# Patient Record
Sex: MALE | Race: WHITE | HISPANIC OR LATINO | Employment: PART TIME | URBAN - METROPOLITAN AREA
[De-identification: names, ages, dates, MRNs, and addresses within clinical notes are randomized per-mention and may not be internally consistent; named-entity substitution may affect disease eponyms.]

---
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
## 2020-02-27 ENCOUNTER — HOSPITAL ENCOUNTER (EMERGENCY)
Facility: HOSPITAL | Age: 20
Discharge: HOME | End: 2020-02-27
Payer: COMMERCIAL

## 2020-02-27 VITALS
RESPIRATION RATE: 16 BRPM | OXYGEN SATURATION: 98 % | WEIGHT: 180.56 LBS | TEMPERATURE: 98.7 F | SYSTOLIC BLOOD PRESSURE: 135 MMHG | DIASTOLIC BLOOD PRESSURE: 60 MMHG | HEART RATE: 79 BPM

## 2020-02-27 DIAGNOSIS — B30.9 ACUTE VIRAL CONJUNCTIVITIS OF RIGHT EYE: Primary | ICD-10-CM

## 2020-02-27 PROCEDURE — 99282 EMERGENCY DEPT VISIT SF MDM: CPT

## 2020-02-27 PROCEDURE — 99284 EMERGENCY DEPT VISIT MOD MDM: CPT | Performed by: PHYSICIAN ASSISTANT

## 2020-02-27 RX ORDER — TETRACAINE HYDROCHLORIDE 5 MG/ML
1 SOLUTION OPHTHALMIC ONCE
Status: COMPLETED | OUTPATIENT
Start: 2020-02-27 | End: 2020-02-27

## 2020-02-27 RX ORDER — ERYTHROMYCIN 5 MG/G
OINTMENT OPHTHALMIC
Qty: 3.5 G | Refills: 0 | Status: SHIPPED | OUTPATIENT
Start: 2020-02-27

## 2020-02-27 RX ADMIN — TETRACAINE HYDROCHLORIDE 1 DROP: 5 SOLUTION OPHTHALMIC at 20:34

## 2020-02-27 RX ADMIN — FLUORESCEIN SODIUM 1 STRIP: 1 STRIP OPHTHALMIC at 20:34

## 2020-02-28 NOTE — DISCHARGE INSTRUCTIONS
Acute viral conjunctivitis  -Erythromycin ointment, half-inch ribbon into both lower eyelid 6 times a day times 5-7 days  -Warm compress and ice

## 2020-02-28 NOTE — ED PROVIDER NOTES
History  Chief Complaint   Patient presents with    Eye Redness     Pt reports redness and irritation that began on Monday  Pt denies eye discharge byt states he has discharge out of right nostril  Pt has photosensitivity  Pt feels that something is in his eye  HPI  71-year-old male with a past medical history who presented with right eye pain rated 5-6/10 since Monday  Patient stated he does not recall anything following in his eye, and had no exposure to anything harmful  Patient wears glasses daily  He denies any contact where  Worsening pain over the last few days as well as discomfort  Patient states he has been rubbing at his eye  He admits to clear liquid discharge, no a purulent discharge  Discomfort worse when waking up in going to sleep  He trialed optic on a allergy relief drops with no relief  He notes no trauma to the eye  Prior to Admission Medications   Prescriptions Last Dose Informant Patient Reported? Taking?   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       Past Surgical History:   Procedure Laterality Date    APPENDECTOMY      NO PAST SURGERIES         History reviewed  No pertinent family history  I have reviewed and agree with the history as documented  E-Cigarette/Vaping    E-Cigarette Use Never User      E-Cigarette/Vaping Substances     Social History     Tobacco Use    Smoking status: Never Smoker    Smokeless tobacco: Never Used   Substance Use Topics    Alcohol use: No    Drug use: No       Review of Systems   Constitutional: Negative for activity change, appetite change, chills and fever  HENT: Negative for congestion, rhinorrhea and sore throat      Eyes: Positive for photophobia, pain (Since Monday), discharge (Clear, tear-like), redness and itching  Respiratory: Negative for shortness of breath and wheezing  Cardiovascular: Negative for chest pain and palpitations  Gastrointestinal: Negative for abdominal pain, diarrhea, nausea and vomiting  Genitourinary: Negative for difficulty urinating  Musculoskeletal: Negative for back pain and neck pain  Skin: Negative for color change and wound  Neurological: Negative for dizziness, weakness and light-headedness  Psychiatric/Behavioral: Negative for agitation and behavioral problems  Physical Exam  Physical Exam   Constitutional: He is oriented to person, place, and time  He appears well-developed and well-nourished  No distress  HENT:   Head: Normocephalic and atraumatic  Eyes: Pupils are equal, round, and reactive to light  EOM and lids are normal  Right eye exhibits discharge (Clear liquid)  Right eye exhibits no hordeolum  No foreign body present in the right eye  Left eye exhibits no discharge and no hordeolum  No foreign body present in the left eye  Right conjunctiva is injected  Right conjunctiva has no hemorrhage  Left conjunctiva is not injected  Left conjunctiva has no hemorrhage  Neck: Normal range of motion  Neck supple  Cardiovascular: Normal rate and regular rhythm  Pulmonary/Chest: Effort normal and breath sounds normal    Abdominal: Soft  There is no tenderness  Musculoskeletal: Normal range of motion  Neurological: He is alert and oriented to person, place, and time  Skin: Skin is warm and dry  No rash noted  No erythema  Psychiatric: He has a normal mood and affect  His behavior is normal    Vitals reviewed        Vital Signs  ED Triage Vitals [02/27/20 2024]   Temperature Pulse Respirations Blood Pressure SpO2   98 7 °F (37 1 °C) 79 16 135/60 98 %      Temp Source Heart Rate Source Patient Position - Orthostatic VS BP Location FiO2 (%)   Temporal -- Lying Right arm --      Pain Score       7           Vitals:    02/27/20 2024   BP: 135/60   Pulse: 79   Patient Position - Orthostatic VS: Lying         Visual Acuity      ED Medications  Medications   fluorescein sodium sterile ophthalmic strip 1 strip (1 strip Right Eye Given 2/27/20 2034)   tetracaine 0 5 % ophthalmic solution 1 drop (1 drop Right Eye Given 2/27/20 2034)       Diagnostic Studies  Results Reviewed     None                 No orders to display              Procedures  Procedures         ED Course  ED Course as of Feb 27 2155 Thu Feb 27, 2020 2039 Pain and discomfort and right eye since Monday, cannot recall if anything fell into his eye, feels like something is stuck in his eye, watering constantly, no purulent discharge   -Tetracaine Hydrochloride drop in R eye  -Fluorescein stain in R eye  -Black light examination      2100 No foreign body noted on exam  No corneal abrasions noted on exam                                    MDM  Number of Diagnoses or Management Options  Diagnosis management comments: 79-year-old male with no past medical history presents with right eye pain, pruritus, irritation, clear liquid drainage  Reports no trauma to the right eye  Denies any debris in eye  Wears glasses, denies contact where  Plan  -fluorescein stain  -tetracaine drops  -black light exam-negative, no foreign bodies noted, no debris noted, no corneal abrasions noted    Likely viral conjunctivitis, however due to significant conjunctiva injection, will send with erythromycin eyedrops      Patient Progress  Patient progress: stable        Disposition  Final diagnoses:   Acute viral conjunctivitis of right eye     Time reflects when diagnosis was documented in both MDM as applicable and the Disposition within this note     Time User Action Codes Description Comment    2/27/2020  9:08 PM Bushra Ramirez Add [B30 9] Acute viral conjunctivitis of right eye       ED Disposition     ED Disposition Condition Date/Time Comment    Discharge Stable Thu Feb 27, 2020  9:08 PM Deisy Mcduffie discharge to home/self care  Follow-up Information     Follow up With Specialties Details Why [address] [facility]  Schedule an appointment as soon as possible for a visit  If symptoms worsen [address]  [phone]          Patient's Medications   Discharge Prescriptions    ERYTHROMYCIN (ILOTYCIN) OPHTHALMIC OINTMENT    Place a 1/2 inch ribbon of ointment into the in both lower eyelids 6x a day x 5-7 days       Start Date: 2/27/2020 End Date: --       Order Dose: --       Quantity: 3 5 g    Refills: 0     No discharge procedures on file      PDMP Review     None          ED Provider  Electronically Signed by           Marianna Winston PA-C  02/27/20 2112       Marianna Winston PA-C  02/27/20 2113       Marianna Winston PA-C  02/27/20 8637

## 2020-03-02 ENCOUNTER — OFFICE VISIT (OUTPATIENT)
Dept: FAMILY MEDICINE CLINIC | Facility: CLINIC | Age: 20
End: 2020-03-02

## 2020-03-02 VITALS
SYSTOLIC BLOOD PRESSURE: 122 MMHG | OXYGEN SATURATION: 97 % | HEIGHT: 68 IN | RESPIRATION RATE: 18 BRPM | HEART RATE: 73 BPM | DIASTOLIC BLOOD PRESSURE: 78 MMHG | WEIGHT: 180.4 LBS | BODY MASS INDEX: 27.34 KG/M2 | TEMPERATURE: 98.8 F

## 2020-03-02 DIAGNOSIS — H57.11 ACUTE RIGHT EYE PAIN: Primary | ICD-10-CM

## 2020-03-02 PROCEDURE — T1015 CLINIC SERVICE: HCPCS | Performed by: FAMILY MEDICINE

## 2020-03-02 PROCEDURE — 1036F TOBACCO NON-USER: CPT | Performed by: FAMILY MEDICINE

## 2020-03-02 PROCEDURE — 99202 OFFICE O/P NEW SF 15 MIN: CPT | Performed by: FAMILY MEDICINE

## 2020-03-02 PROCEDURE — 3008F BODY MASS INDEX DOCD: CPT | Performed by: FAMILY MEDICINE

## 2020-03-02 NOTE — ASSESSMENT & PLAN NOTE
No improvement with erythromycin ointment  Noted worsening over the past 3-4 days with increased swelling of the right eye  Physical examination notable for pain with ocular movement on the H test, blurry vision on the right eye, significant conjunctivitis and severe photophobia  There is a concern for possible anterior uveitis versus developing glaucoma giving the length of infection  Advised the patient to hold off on using the ointment at the moment  Scheduled the patient with ophthalmology today at 11:00 a m

## 2020-03-02 NOTE — PROGRESS NOTES
Assessment/Plan:    Acute right eye pain  No improvement with erythromycin ointment  Noted worsening over the past 3-4 days with increased swelling of the right eye  Physical examination notable for pain with ocular movement on the H test, blurry vision on the right eye, significant conjunctivitis and severe photophobia  There is a concern for possible anterior uveitis versus developing glaucoma giving the length of infection  Advised the patient to hold off on using the ointment at the moment  Scheduled the patient with ophthalmology today at 11:00 a m  Return in about 6 months (around 9/2/2020) for Annual physical       Diagnoses and all orders for this visit:    Acute right eye pain        Subjective:     Heaven Mariano is a 20 y o  male who  has a past medical history of Lead poisoning  who presented to the office today for sick visit for possible conjunctivitis  HPI  Patient mentioned that:  Conjunctivitis    The current episode started 5 to 7 days ago  The onset was sudden  The problem has been gradually worsening  The problem is moderate  The symptoms are relieved by one or more prescription drugs and one or more OTC medications  The symptoms are aggravated by light  Associated symptoms include decreased vision, photophobia, rhinorrhea, eye discharge, eye pain and eye redness  Pertinent negatives include no fever, no double vision, no eye itching, no abdominal pain, no congestion, no ear discharge, no headaches, no sore throat, no cough, no URI and no rash  The eye pain is severe  The right eye is affected  The eye pain is associated with movement  Of note:  Patient does not wear any contact lenses  He is noted to have 2 different some glasses to cover his entire field of vision                     The following portions of the patient's history were reviewed and updated as appropriate: allergies, current medications, past family history, past medical history, past social history, past surgical history and problem list     Current Outpatient Medications on File Prior to Visit   Medication Sig Dispense Refill    erythromycin (ILOTYCIN) ophthalmic ointment Place a 1/2 inch ribbon of ointment into the in both lower eyelids 6x a day x 5-7 days 3 5 g 0    ibuprofen (MOTRIN) 800 mg tablet Take 0 5 tablets by mouth every 6 (six) hours as needed for mild pain (Patient not taking: Reported on 3/2/2020) 30 tablet 0     No current facility-administered medications on file prior to visit  Review of Systems   Constitutional: Negative for chills and fever  HENT: Positive for rhinorrhea  Negative for congestion, ear discharge and sore throat  Eyes: Positive for photophobia, pain, discharge, redness and visual disturbance  Negative for double vision and itching  Respiratory: Negative for cough and chest tightness  Cardiovascular: Negative for chest pain  Gastrointestinal: Negative for abdominal pain  Skin: Negative for rash  Neurological: Negative for weakness and headaches  Hematological: Negative for adenopathy  Objective:    /78 (BP Location: Left arm, Patient Position: Sitting, Cuff Size: Standard)   Pulse 73   Temp 98 8 °F (37 1 °C) (Temporal)   Resp 18   Ht 5' 7 5" (1 715 m)   Wt 81 8 kg (180 lb 6 4 oz)   SpO2 97%   BMI 27 84 kg/m²       Physical Exam   Constitutional: He is oriented to person, place, and time  He appears well-developed and well-nourished  No distress  HENT:   Head: Normocephalic and atraumatic  Nose: Nose normal    Eyes: Pupils are equal, round, and reactive to light  EOM and lids are normal  Lids are everted and swept, no foreign bodies found  Right eye exhibits discharge  Right eye exhibits no exudate  Left eye exhibits discharge  Left eye exhibits no exudate  Right conjunctiva is injected  Left conjunctiva is not injected  No scleral icterus  Right pupil is round and reactive  Left pupil is round and reactive   Pupils are equal  Fundoscopic exam:       The right eye shows no exudate  The right eye shows red reflex  Right fundus examination:  Noted significant conjunctivitis with severe photophobia, blurry vision in all fields, very difficult to evaluate the right eye as the patient was unable to keep his eyes open but no noted significant papilledema   Neck: Normal range of motion  Neck supple  Cardiovascular: Normal rate, regular rhythm and normal heart sounds  Exam reveals no gallop and no friction rub  No murmur heard  Pulmonary/Chest: Effort normal and breath sounds normal  No respiratory distress  Abdominal: Soft  Bowel sounds are normal  He exhibits no distension  Musculoskeletal: Normal range of motion  He exhibits no edema  Lymphadenopathy:     He has no cervical adenopathy  Neurological: He is alert and oriented to person, place, and time  Skin: Skin is warm and dry  No rash noted  He is not diaphoretic  No erythema  Vitals reviewed        Eli Salvador MD  03/02/20  9:47 AM

## 2021-03-29 ENCOUNTER — OFFICE VISIT (OUTPATIENT)
Dept: FAMILY MEDICINE CLINIC | Facility: CLINIC | Age: 21
End: 2021-03-29

## 2021-03-29 VITALS
HEART RATE: 87 BPM | DIASTOLIC BLOOD PRESSURE: 68 MMHG | BODY MASS INDEX: 30.31 KG/M2 | HEIGHT: 68 IN | WEIGHT: 200 LBS | TEMPERATURE: 97.5 F | SYSTOLIC BLOOD PRESSURE: 104 MMHG | OXYGEN SATURATION: 97 % | RESPIRATION RATE: 16 BRPM

## 2021-03-29 DIAGNOSIS — Z02.4 DRIVER'S PERMIT PHYSICAL EXAMINATION: Primary | ICD-10-CM

## 2021-03-29 DIAGNOSIS — Z23 ENCOUNTER FOR IMMUNIZATION: ICD-10-CM

## 2021-03-29 PROCEDURE — 99213 OFFICE O/P EST LOW 20 MIN: CPT | Performed by: FAMILY MEDICINE

## 2021-03-29 NOTE — PROGRESS NOTES
Chief Complaint   Patient presents with    Physical Exam     drivers permit      Blood Pressure: 104/68, Pulse: 87, Respirations: 16, Temperature: 97 5 °F (36 4 °C), Temp Source: Temporal, SpO2: 97 %, Weight - Scale: 90 7 kg (200 lb), Height: 5' 8" (172 7 cm), Pain Score: 0-No pain    Assessment/Plan  1  Normal 's physical   Reviewed medical conditions on 's physical form with patient in detail, all negative  Form completed and handed back to patient  Counseled on  safety, including seatbelts, driving while impaired, and distracted driving  The above was discussed in layman's terms  The patient was engaged using the shared-decision making process and verbalized understanding of the treatment plan  All questions were answered to the patient's satisfaction  Diagnoses and all orders for this visit:    's permit physical examination    Encounter for immunization    Other orders  -     Cancel: influenza vaccine, quadrivalent, 0 5 mL, preservative-free, for adult and pediatric patients 6 mos+ (AFLURIA, FLUARIX, FLULAVAL, FLUZONE)          Subjective/HPI  1  's physical     Review of Systems  Constitutional: Negative for activity change, appetite change, chills and fever  Respiratory: Negative for shortness of breath  Cardiovascular: Negative for chest pain  Gastrointestinal: Negative for blood in stool, nausea and vomiting  Genitourinary: Negative for difficulty urinating and dysuria  Psychiatric/Behavioral: Negative for behavioral problems  Pertinent items are noted in chief complaint and HPI  Social History    reports that he has never smoked  He has never used smokeless tobacco     reports no history of alcohol use    reports no history of drug use  Objective  Physical Exam   Constitutional: He is oriented to person, place, and time  He appears well-developed  No distress  HENT:   Head: Normocephalic and atraumatic     Eyes: Conjunctivae are normal  Neck: Normal range of motion  Cardiovascular: Normal rate, regular rhythm and normal heart sounds  No murmur heard  Pulmonary/Chest: Effort normal and breath sounds normal  No respiratory distress  He has no wheezes  Musculoskeletal: Normal range of motion  Neurological: He is alert and oriented to person, place, and time  Coordination and gait normal    Psychiatric: His behavior is normal    Nursing note and vitals reviewed  Chart Review/Health Maintenance   The following have been updated: none    No Known Allergies  Current Outpatient Medications:     erythromycin (ILOTYCIN) ophthalmic ointment, Place a 1/2 inch ribbon of ointment into the in both lower eyelids 6x a day x 5-7 days (Patient not taking: Reported on 3/29/2021), Disp: 3 5 g, Rfl: 0    ibuprofen (MOTRIN) 800 mg tablet, Take 0 5 tablets by mouth every 6 (six) hours as needed for mild pain (Patient not taking: Reported on 3/2/2020), Disp: 30 tablet, Rfl: 0    Patient Active Problem List   Diagnosis    Acute right eye pain     Past Surgical History:   Procedure Laterality Date    APPENDECTOMY      NO PAST SURGERIES       No family history on file    Health Maintenance   Topic Date Due    HIV Screening  Never done    COVID-19 Vaccine (1) Never done    BMI: Followup Plan  Never done    Annual Physical  Never done    Influenza Vaccine (1) 09/01/2020    DTaP,Tdap,and Td Vaccines (7 - Td) 06/16/2021    Depression Screening PHQ  03/29/2022    BMI: Adult  03/29/2022    HIB Vaccine  Completed    Hepatitis B Vaccine  Completed    IPV Vaccine  Completed    Hepatitis A Vaccine  Completed    HPV Vaccine  Completed    Pneumococcal Vaccine: Pediatrics (0 to 5 Years) and At-Risk Patients (6 to 59 Years)  Aged Out    Meningococcal ACWY Vaccine  Aged Out

## 2022-06-09 ENCOUNTER — HOSPITAL ENCOUNTER (EMERGENCY)
Facility: HOSPITAL | Age: 22
Discharge: HOME | End: 2022-06-09
Attending: EMERGENCY MEDICINE
Payer: COMMERCIAL

## 2022-06-09 VITALS
HEART RATE: 66 BPM | WEIGHT: 202.7 LBS | BODY MASS INDEX: 30.82 KG/M2 | SYSTOLIC BLOOD PRESSURE: 114 MMHG | TEMPERATURE: 98 F | OXYGEN SATURATION: 100 % | DIASTOLIC BLOOD PRESSURE: 81 MMHG | RESPIRATION RATE: 16 BRPM

## 2022-06-09 DIAGNOSIS — J06.9 VIRAL URI WITH COUGH: Primary | ICD-10-CM

## 2022-06-09 DIAGNOSIS — Z20.822 SUSPECTED COVID-19 VIRUS INFECTION: ICD-10-CM

## 2022-06-09 PROCEDURE — 87636 SARSCOV2 & INF A&B AMP PRB: CPT | Performed by: EMERGENCY MEDICINE

## 2022-06-09 PROCEDURE — 99283 EMERGENCY DEPT VISIT LOW MDM: CPT

## 2022-06-09 PROCEDURE — 99284 EMERGENCY DEPT VISIT MOD MDM: CPT | Performed by: EMERGENCY MEDICINE

## 2022-06-09 RX ORDER — IBUPROFEN 600 MG/1
600 TABLET ORAL ONCE
Status: COMPLETED | OUTPATIENT
Start: 2022-06-09 | End: 2022-06-09

## 2022-06-09 RX ORDER — IBUPROFEN 600 MG/1
600 TABLET ORAL EVERY 6 HOURS PRN
Qty: 30 TABLET | Refills: 0 | Status: SHIPPED | OUTPATIENT
Start: 2022-06-09

## 2022-06-09 RX ADMIN — IBUPROFEN 600 MG: 600 TABLET ORAL at 12:14

## 2022-06-09 NOTE — Clinical Note
Kaila Garcia was seen and treated in our emergency department on 2022  Diagnosis:     Barbara Garza  may return to work on return date  He may return on this date: 2022         If you have any questions or concerns, please don't hesitate to call        Donny Apgar, MD    ______________________________           _______________          _______________  Hospital Representative                              Date                                Time

## 2022-06-09 NOTE — ED PROVIDER NOTES
History  Chief Complaint   Patient presents with    Cough     Pt reports sneezing, productive cough with brown sputum, HA that resolved with Tylenol PM, and body aches that started Tuesday  Pt reports fever of unknown temperature at home  Pt reports last taking Tylenol PM this AM       69-year-old healthy male presenting for evaluation of upper respiratory symptoms that started several days ago  Patient notes subjective fever and chills in addition to nasal congestion, headache, sneezing, intermittent sore throat, and cough that is intermittently productive of clear sputum  Patient denies associated chest pain or shortness of breath  Patient reported his headache as an 8/10 this morning but improved after taking acetaminophen over-the-counter  Patient also notes intermittent myalgias that also improved with the acetaminophen  He denies sick contacts  Patient also endorses diarrhea; he had nausea vomiting 2 days ago but none since  He denies abdominal pain and dysuria  Prior to Admission Medications   Prescriptions Last Dose Informant Patient Reported? Taking?   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       Past Surgical History:   Procedure Laterality Date    APPENDECTOMY      NO PAST SURGERIES         History reviewed  No pertinent family history  I have reviewed and agree with the history as documented      E-Cigarette/Vaping    E-Cigarette Use Never User      E-Cigarette/Vaping Substances     Social History     Tobacco Use    Smoking status: Never Smoker    Smokeless tobacco: Never Used   Vaping Use    Vaping Use: Never used   Substance Use Topics    Alcohol use: No    Drug use: No       Review of Systems Constitutional: Positive for chills and fever  HENT: Positive for congestion and sore throat  Negative for rhinorrhea  Eyes: Negative for pain, discharge and visual disturbance  Respiratory: Positive for cough  Negative for shortness of breath  Cardiovascular: Negative for chest pain, palpitations and leg swelling  Gastrointestinal: Positive for diarrhea  Negative for abdominal pain, nausea and vomiting  Genitourinary: Negative for dysuria, frequency and hematuria  Musculoskeletal: Positive for arthralgias and myalgias  Skin: Negative for color change and rash  Neurological: Positive for headaches  Negative for dizziness, weakness, light-headedness and numbness  Hematological: Does not bruise/bleed easily  Physical Exam  Physical Exam  Vitals and nursing note reviewed  Constitutional:       General: He is not in acute distress  Appearance: Normal appearance  HENT:      Head: Normocephalic and atraumatic  Right Ear: Tympanic membrane normal       Left Ear: There is impacted cerumen  Nose: Congestion present  Mouth/Throat:      Mouth: Mucous membranes are moist       Pharynx: Posterior oropharyngeal erythema present  No oropharyngeal exudate  Comments: Uvula midline  Eyes:      General: No scleral icterus  Right eye: No discharge  Left eye: No discharge  Extraocular Movements: Extraocular movements intact  Conjunctiva/sclera: Conjunctivae normal       Pupils: Pupils are equal, round, and reactive to light  Cardiovascular:      Rate and Rhythm: Normal rate and regular rhythm  Pulmonary:      Effort: Pulmonary effort is normal  No respiratory distress  Breath sounds: No wheezing, rhonchi or rales  Abdominal:      General: There is no distension  Palpations: Abdomen is soft  Tenderness: There is no abdominal tenderness  There is no guarding or rebound  Musculoskeletal:         General: No swelling or deformity  Cervical back: Neck supple  Lymphadenopathy:      Cervical: No cervical adenopathy  Skin:     General: Skin is warm and dry  Findings: No rash  Neurological:      General: No focal deficit present  Mental Status: He is alert and oriented to person, place, and time  Mental status is at baseline  Psychiatric:         Mood and Affect: Mood normal          Behavior: Behavior normal          Vital Signs  ED Triage Vitals [06/09/22 1155]   Temperature Pulse Respirations Blood Pressure SpO2   98 °F (36 7 °C) 66 16 114/81 100 %      Temp Source Heart Rate Source Patient Position - Orthostatic VS BP Location FiO2 (%)   Tympanic Monitor -- -- --      Pain Score       --           Vitals:    06/09/22 1155   BP: 114/81   Pulse: 66         Visual Acuity      ED Medications  Medications   ibuprofen (MOTRIN) tablet 600 mg (600 mg Oral Given 6/9/22 1214)       Diagnostic Studies  Results Reviewed     Procedure Component Value Units Date/Time    COVID/FLU - 24 hour TAT [[id number]] Collected: 06/09/22 1215    Lab Status: In process Specimen: Nares from Nose Updated: 06/09/22 1227                 No orders to display              Procedures  Procedures         ED Course                                             MDM  Number of Diagnoses or Management Options  Suspected COVID-19 virus infection  Viral URI with cough  Diagnosis management comments: 16-year-old healthy male presenting for evaluation of upper respiratory symptoms  I suspect a viral etiology, such as COVID-19, influenza, or other viral process  There is no evidence of acute otitis media  No evidence of bacterial pharyngitis  No evidence of peritonsillar abscess or retropharyngeal abscess  Patient is saturating 100% on room air and does not have focality on lung examination  Low suspicion for pneumonia at this time  Do not feel he requires chest x-ray or laboratory studies at this time  Clinically, he is well-hydrated    COVID-19 testing will be sent   Patient given analgesia in the emergency department  Counseled on supportive measures at home  Cautioned against the use of multiple medications containing acetaminophen at home for his symptoms  Will follow-up with PCP  Return precautions discussed  Patient in agreement and understanding of these instructions  Disposition  Final diagnoses:   Viral URI with cough   Suspected COVID-19 virus infection     Time reflects when diagnosis was documented in both MDM as applicable and the Disposition within this note     Time User Action Codes Description Comment    2022 12:11 PM Aisha Villarreal Add [J06 9] Viral URI with cough     2022 12:11 PM [address] [Z20 822] Suspected COVID-19 virus infection       ED Disposition     ED Disposition   Discharge    Condition   Stable    Date/Time   Thu 2022 12:11 PM    Comment   Kaila Jose discharge to home/self care  Follow-up Information     Follow up With Specialties Details Why [address] Jason KIRKLAND MD Family Medicine Schedule an appointment as soon as possible for a visit   [place] [id number]  [address] [place] [address]  Raudel Hinton   [phone]  [phone]  [phone]            Discharge Medication List as of 2022 12:13 PM      START taking these medications    Details   ibuprofen (MOTRIN) 600 mg tablet Take 1 tablet (600 mg total) by mouth every 6 (six) hours as needed for mild pain, Starting Thu 2022, Normal         CONTINUE these medications which have NOT CHANGED    Details   erythromycin (ILOTYCIN) ophthalmic ointment Place a 1/2 inch ribbon of ointment into the in both lower eyelids 6x a day x 5-7 days, Normal             No discharge procedures on file      PDMP Review     None          ED Provider  Electronically Signed by           Donny Apgar, MD  [phone]

## 2022-06-10 LAB
FLUAV RNA RESP QL NAA+PROBE: NEGATIVE
FLUBV RNA RESP QL NAA+PROBE: NEGATIVE
SARS-COV-2 RNA RESP QL NAA+PROBE: POSITIVE

## 2022-06-11 ENCOUNTER — TELEPHONE (OUTPATIENT)
Dept: EMERGENCY DEPT | Facility: HOSPITAL | Age: 22
End: 2022-06-11

## 2022-06-11 NOTE — TELEPHONE ENCOUNTER
Called pt he is feeling much better ,  Discussed covid + test reults   Pt will quarantine x 5 days then mask for 5 more days,  Will recheck with family doctor via televisit

## 2022-07-07 ENCOUNTER — OFFICE VISIT (OUTPATIENT)
Dept: FAMILY MEDICINE CLINIC | Facility: CLINIC | Age: 22
End: 2022-07-07

## 2022-07-07 VITALS
BODY MASS INDEX: 31.42 KG/M2 | DIASTOLIC BLOOD PRESSURE: 70 MMHG | HEART RATE: 97 BPM | HEIGHT: 68 IN | TEMPERATURE: 97.7 F | OXYGEN SATURATION: 98 % | WEIGHT: 207.3 LBS | RESPIRATION RATE: 18 BRPM | SYSTOLIC BLOOD PRESSURE: 114 MMHG

## 2022-07-07 DIAGNOSIS — E66.9 OBESITY (BMI 30.0-34.9): ICD-10-CM

## 2022-07-07 DIAGNOSIS — Z02.4 DRIVER'S PERMIT PHYSICAL EXAMINATION: ICD-10-CM

## 2022-07-07 DIAGNOSIS — Z23 ENCOUNTER FOR IMMUNIZATION: ICD-10-CM

## 2022-07-07 DIAGNOSIS — H61.22 IMPACTED CERUMEN OF LEFT EAR: ICD-10-CM

## 2022-07-07 DIAGNOSIS — Z00.00 ANNUAL PHYSICAL EXAM: Primary | ICD-10-CM

## 2022-07-07 PROBLEM — H57.11 ACUTE RIGHT EYE PAIN: Status: RESOLVED | Noted: 2020-03-02 | Resolved: 2022-07-07

## 2022-07-07 PROCEDURE — 90471 IMMUNIZATION ADMIN: CPT

## 2022-07-07 PROCEDURE — 90715 TDAP VACCINE 7 YRS/> IM: CPT

## 2022-07-07 PROCEDURE — 99395 PREV VISIT EST AGE 18-39: CPT

## 2022-07-07 RX ORDER — MULTIVITAMIN
1 TABLET ORAL DAILY
COMMUNITY

## 2022-07-07 NOTE — PATIENT INSTRUCTIONS

## 2022-07-07 NOTE — ASSESSMENT & PLAN NOTE
Pt with no hx of neurological disorders, seizures, syncope or other loss of consciousness, DM, HTN, CV disorders, cognitive impairments, alcohol abuse, or drug abuse  In-office vision screening passed and no exam findings today that would preclude safe operation of a vehicle  Pt counseled regarding safe driving habits  Learner's Permit form signed

## 2022-07-07 NOTE — PROGRESS NOTES
[address] [facility] [address] [facility] [place]    NAME: Sharon Schrader  AGE: 24 y o  SEX: male  : 2000     DATE: 2022     Assessment and Plan:     Problem List Items Addressed This Visit        Nervous and Auditory    Impacted cerumen of left ear     Debrox 5 drops left ear BID x4 days then PRN  Relevant Medications    carbamide peroxide (DEBROX) 6 5 % otic solution       Other    's permit physical examination     Pt with no hx of neurological disorders, seizures, syncope or other loss of consciousness, DM, HTN, CV disorders, cognitive impairments, alcohol abuse, or drug abuse  In-office vision screening passed and no exam findings today that would preclude safe operation of a vehicle  Pt counseled regarding safe driving habits  Learner's Permit form signed  Obesity (BMI 30 0-34  9)     Pt plans to join a gym once he obtains his 's license  He is limited by his need to take the bus for transportation    - Nutrition and exercise counseling provided  - Encouraged walking for exercise until gym membership can be obtained  Other Visit Diagnoses     Annual physical exam    -  Primary    Encounter for immunization        Relevant Orders    TDAP VACCINE GREATER THAN OR EQUAL TO 6YO IM (Completed)            BMI Counseling: Body mass index is 31 52 kg/m²  The BMI is above normal  Nutrition recommendations include encouraging healthy choices of fruits and vegetables and decreasing fast food intake  Exercise recommendations include moderate physical activity 150 minutes/week and obtaining a gym membership  Rationale for BMI follow-up plan is due to patient being overweight or obese  Depression Screening and Follow-up Plan: Patient was screened for depression during today's encounter  They screened negative with a PHQ-2 score of 0          Return in about 1 year (around 2023) for Annual physical      Chief Complaint:     Chief Complaint   Patient presents with    Physical Exam     dmv      History of Present Illness:     Yuan Werner     Diet and Physical Activity  · Diet/Nutrition: well balanced diet  Few vegetables, does eat fruit  · Exercise: walking  Depression Screening  PHQ-2/9 Depression Screening    Little interest or pleasure in doing things: 0 - not at all  Feeling down, depressed, or hopeless: 0 - not at all  PHQ-2 Score: 0  PHQ-2 Interpretation: Negative depression screen       General Health  · Sleep: sleeps well  · Hearing: normal - bilateral   · Vision: no vision problems  Wears glasses  · Dental: no dental visits for >1 year  · : denied symptoms and declined STD testing at this time  Review of Systems:     Review of Systems   Constitutional: Negative for fatigue, fever and unexpected weight change  Eyes: Negative for visual disturbance  Respiratory: Negative for cough, shortness of breath and wheezing  Cardiovascular: Negative for chest pain, palpitations and leg swelling  Gastrointestinal: Negative for abdominal pain, diarrhea, nausea and vomiting  Genitourinary: Negative for difficulty urinating  Neurological: Negative for dizziness, seizures, syncope, weakness, numbness and headaches  Psychiatric/Behavioral: Negative for dysphoric mood and sleep disturbance  The patient is not nervous/anxious  All other systems reviewed and are negative       Past Medical History:     Past Medical History:   Diagnosis Date    Acute right eye pain 3/2/2020    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      Past Surgical History:     Past Surgical History:   Procedure Laterality Date    APPENDECTOMY      Around age 15 or 24    Sharp NO PAST SURGERIES        Social History:     Social History     Socioeconomic History    Marital status: Single     Spouse name: None    Number of children: None    Years of education: None    Highest education level: None Occupational History    None   Tobacco Use    Smoking status: Never Smoker    Smokeless tobacco: Never Used   Vaping Use    Vaping Use: Never used   Substance and Sexual Activity    Alcohol use: No    Drug use: No    Sexual activity: None   Other Topics Concern    None   Social History Narrative    None     Social Determinants of Health     Financial Resource Strain: Low Risk     Difficulty of Paying Living Expenses: Not hard at all   Food Insecurity: No Food Insecurity    Worried About Running Out of Food in the Last Year: Never true    Fito of Food in the Last Year: Never true   Transportation Needs: No Transportation Needs    Lack of Transportation (Medical): No    Lack of Transportation (Non-Medical): No   Physical Activity: Not on file   Stress: Not on file   Social Connections: Not on file   Intimate Partner Violence: Not on file   Housing Stability: Not on file      Family History:     Family History   Problem Relation Age of Onset    No Known Problems Mother     No Known Problems Father     Diabetes Maternal Grandmother       Current Medications:     Current Outpatient Medications   Medication Sig Dispense Refill    carbamide peroxide (DEBROX) 6 5 % otic solution Administer 5 drops into the left ear 2 (two) times a day for 4 days 15 mL 0    Multiple Vitamin (multivitamin) tablet Take 1 tablet by mouth daily      erythromycin (ILOTYCIN) ophthalmic ointment Place a 1/2 inch ribbon of ointment into the in both lower eyelids 6x a day x 5-7 days 3 5 g 0    ibuprofen (MOTRIN) 600 mg tablet Take 1 tablet (600 mg total) by mouth every 6 (six) hours as needed for mild pain 30 tablet 0     No current facility-administered medications for this visit        Allergies:     No Known Allergies   Physical Exam:     /70 (BP Location: Right arm, Patient Position: Sitting, Cuff Size: Standard)   Pulse 97   Temp 97 7 °F (36 5 °C) (Temporal)   Resp 18   Ht 5' 8" (1 727 m)   Wt 94 kg (207 lb 4 8 oz)   SpO2 98%   BMI 31 52 kg/m²     Physical Exam  Vitals reviewed  Constitutional:       General: He is not in acute distress  Appearance: He is obese  He is not ill-appearing or diaphoretic  HENT:      Head: Normocephalic and atraumatic  Right Ear: Tympanic membrane, ear canal and external ear normal       Left Ear: External ear normal  There is impacted cerumen  Nose: Nose normal       Mouth/Throat:      Mouth: Mucous membranes are moist       Pharynx: Oropharynx is clear  Eyes:      General: Lids are normal       Extraocular Movements: Extraocular movements intact  Right eye: Nystagmus present  Left eye: Nystagmus present  Conjunctiva/sclera: Conjunctivae normal       Pupils: Pupils are equal, round, and reactive to light  Comments: Mild nystagmus with horizontal eye movements  Neck:      Thyroid: No thyromegaly or thyroid tenderness  Cardiovascular:      Rate and Rhythm: Normal rate and regular rhythm  Pulses: Normal pulses  Heart sounds: Normal heart sounds  No murmur heard  Pulmonary:      Effort: Pulmonary effort is normal       Breath sounds: Normal breath sounds  No decreased breath sounds or wheezing  Abdominal:      General: Bowel sounds are normal  There is no distension  Palpations: Abdomen is soft  Tenderness: There is no abdominal tenderness  There is no guarding  Musculoskeletal:      Right lower leg: No edema  Left lower leg: No edema  Lymphadenopathy:      Cervical: No cervical adenopathy  Skin:     General: Skin is warm and dry  Capillary Refill: Capillary refill takes less than 2 seconds  Neurological:      Mental Status: He is alert and oriented to person, place, and time  Cranial Nerves: Cranial nerves are intact  Motor: Motor function is intact  Gait: Gait is intact     Psychiatric:         Mood and Affect: Mood and affect normal           Vu Vergara, [address] [place] [address] [address] [facility]

## 2022-07-07 NOTE — ASSESSMENT & PLAN NOTE
Pt plans to join a gym once he obtains his 's license  He is limited by his need to take the bus for transportation    - Nutrition and exercise counseling provided  - Encouraged walking for exercise until gym membership can be obtained

## 2022-07-14 ENCOUNTER — HOSPITAL ENCOUNTER (EMERGENCY)
Facility: HOSPITAL | Age: 22
Discharge: HOME | End: 2022-07-14
Attending: EMERGENCY MEDICINE | Admitting: EMERGENCY MEDICINE
Payer: COMMERCIAL

## 2022-07-14 ENCOUNTER — PATIENT MESSAGE (OUTPATIENT)
Dept: FAMILY MEDICINE CLINIC | Facility: CLINIC | Age: 22
End: 2022-07-14

## 2022-07-14 VITALS
DIASTOLIC BLOOD PRESSURE: 79 MMHG | RESPIRATION RATE: 20 BRPM | TEMPERATURE: 98.4 F | HEART RATE: 105 BPM | BODY MASS INDEX: 30.14 KG/M2 | SYSTOLIC BLOOD PRESSURE: 139 MMHG | OXYGEN SATURATION: 98 % | WEIGHT: 198.19 LBS

## 2022-07-14 DIAGNOSIS — J02.0 STREP THROAT: Primary | ICD-10-CM

## 2022-07-14 LAB — SARS-COV-2 RNA RESP QL NAA+PROBE: POSITIVE

## 2022-07-14 PROCEDURE — U0003 INFECTIOUS AGENT DETECTION BY NUCLEIC ACID (DNA OR RNA); SEVERE ACUTE RESPIRATORY SYNDROME CORONAVIRUS 2 (SARS-COV-2) (CORONAVIRUS DISEASE [COVID-19]), AMPLIFIED PROBE TECHNIQUE, MAKING USE OF HIGH THROUGHPUT TECHNOLOGIES AS DESCRIBED BY CMS-2020-01-R: HCPCS | Performed by: EMERGENCY MEDICINE

## 2022-07-14 PROCEDURE — 99283 EMERGENCY DEPT VISIT LOW MDM: CPT

## 2022-07-14 PROCEDURE — U0005 INFEC AGEN DETEC AMPLI PROBE: HCPCS | Performed by: EMERGENCY MEDICINE

## 2022-07-14 PROCEDURE — 99284 EMERGENCY DEPT VISIT MOD MDM: CPT | Performed by: EMERGENCY MEDICINE

## 2022-07-14 RX ORDER — AMOXICILLIN 500 MG/1
500 CAPSULE ORAL 3 TIMES DAILY
Qty: 30 CAPSULE | Refills: 0 | Status: SHIPPED | OUTPATIENT
Start: 2022-07-14 | End: 2022-07-24

## 2022-07-14 NOTE — ED PROVIDER NOTES
History  Chief Complaint   Patient presents with    Sore Throat     Sore throat and congestion for past 2 days  States he took nyquil at about 1100     Patient is a 70-year-old male  He presents to the emergency room with a 2 day history of a sore throat  He has had some congestion  He has had a slight cough  He reports fevers  He has had headache and diarrhea  No vomiting  No abdominal pain  No neck pain, photophobia or mental status changes  Moderate in severity without aggravating or relieving factors  Prior to Admission Medications   Prescriptions Last Dose Informant Patient Reported? Taking? Multiple Vitamin (multivitamin) tablet   Yes No   Sig: Take 1 tablet by mouth daily   carbamide peroxide (DEBROX) 6 5 % otic solution   No No   Sig: Administer 5 drops into the left ear 2 (two) times a day for 4 days   erythromycin (ILOTYCIN) ophthalmic ointment   No No   Sig: Place a 1/2 inch ribbon of ointment into the in both lower eyelids 6x a day x 5-7 days   ibuprofen (MOTRIN) 600 mg tablet   No No   Sig: Take 1 tablet (600 mg total) by mouth every 6 (six) hours as needed for mild pain      Facility-Administered Medications: None       Past Medical History:   Diagnosis Date    Acute right eye pain 3/2/2020    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       Past Surgical History:   Procedure Laterality Date    APPENDECTOMY      Around age 15 or 24    Meghann Perkins NO PAST SURGERIES         Family History   Problem Relation Age of Onset    No Known Problems Mother     No Known Problems Father     Diabetes Maternal Grandmother      I have reviewed and agree with the history as documented      E-Cigarette/Vaping    E-Cigarette Use Never User      E-Cigarette/Vaping Substances     Social History     Tobacco Use    Smoking status: Never Smoker    Smokeless tobacco: Never Used   Vaping Use    Vaping Use: Never used   Substance Use Topics    Alcohol use: No    Drug use: No       Review of Systems Constitutional: Positive for fever  Negative for chills  HENT: Positive for congestion and sore throat  Negative for trouble swallowing and voice change  Eyes: Negative for pain, redness and visual disturbance  Respiratory: Positive for cough  Negative for shortness of breath  Cardiovascular: Negative for chest pain and leg swelling  Gastrointestinal: Positive for diarrhea  Negative for abdominal pain and vomiting  Endocrine: Negative for polydipsia and polyuria  Genitourinary: Negative for dysuria, frequency and hematuria  Musculoskeletal: Negative for back pain and neck pain  Skin: Negative for rash and wound  Allergic/Immunologic: Negative for immunocompromised state  Neurological: Positive for headaches  Negative for weakness and numbness  Psychiatric/Behavioral: Negative for hallucinations and suicidal ideas  All other systems reviewed and are negative  Physical Exam  Physical Exam  Vitals reviewed  Constitutional:       General: He is not in acute distress  Appearance: He is not toxic-appearing  HENT:      Head: Normocephalic and atraumatic  Nose: Nose normal       Mouth/Throat:      Mouth: Mucous membranes are moist  No oral lesions  Pharynx: Uvula midline  Oropharyngeal exudate and posterior oropharyngeal erythema present  No uvula swelling  Tonsils: Tonsillar exudate present  No tonsillar abscesses  Eyes:      General:         Right eye: No discharge  Left eye: No discharge  Conjunctiva/sclera: Conjunctivae normal    Cardiovascular:      Rate and Rhythm: Normal rate and regular rhythm  Pulses: Normal pulses  Heart sounds: Normal heart sounds  No murmur heard  No friction rub  No gallop  Pulmonary:      Effort: Pulmonary effort is normal  No respiratory distress  Breath sounds: Normal breath sounds  No stridor  No wheezing, rhonchi or rales  Abdominal:      General: Bowel sounds are normal  There is no distension  Palpations: Abdomen is soft  Tenderness: There is no abdominal tenderness  There is no right CVA tenderness, left CVA tenderness, guarding or rebound  Musculoskeletal:         General: No swelling, tenderness, deformity or signs of injury  Normal range of motion  Cervical back: Normal range of motion and neck supple  No rigidity  Right lower leg: No edema  Left lower leg: No edema  Comments: No calf pain or unilateral leg swelling   Lymphadenopathy:      Cervical: No cervical adenopathy  Skin:     General: Skin is warm and dry  Coloration: Skin is not jaundiced or pale  Findings: No bruising, erythema or rash  Neurological:      General: No focal deficit present  Mental Status: He is alert and oriented to person, place, and time  Cranial Nerves: No facial asymmetry  Sensory: No sensory deficit  Motor: Motor function is intact  Psychiatric:         Mood and Affect: Mood normal          Behavior: Behavior normal          Vital Signs  ED Triage Vitals [07/14/22 1721]   Temperature Pulse Respirations Blood Pressure SpO2   98 4 °F (36 9 °C) 105 20 139/79 98 %      Temp Source Heart Rate Source Patient Position - Orthostatic VS BP Location FiO2 (%)   Tympanic Monitor Sitting Left arm --      Pain Score       --           Vitals:    07/14/22 1721   BP: 139/79   Pulse: 105   Patient Position - Orthostatic VS: Sitting         Visual Acuity      ED Medications  Medications - No data to display    Diagnostic Studies  Results Reviewed     Procedure Component Value Units Date/Time    COVID only [[id number]]     Lab Status: No result Specimen: Nares from Nose                  No orders to display              Procedures  Procedures         ED Course                                             MDM  Number of Diagnoses or Management Options  Diagnosis management comments: Will test for COVID  However clinically this looks more like strep throat    Mono would be less likely  There is no peritonsillar abscess  This is not epiglottitis  This is not meningitis  Appropriate for discharge and outpatient management  Amount and/or Complexity of Data Reviewed  Clinical lab tests: ordered        Disposition  Final diagnoses:   Strep throat     Time reflects when diagnosis was documented in both MDM as applicable and the Disposition within this note     Time User Action Codes Description Comment    7/14/2022  5:53 PM Shruti Diaz Add [J02 0] Strep throat       ED Disposition     ED Disposition   Discharge    Condition   Stable    Date/Time   Thu Jul 14, 2022  5:53 PM    Comment   Jermaine Civil discharge to home/self care  Follow-up Information     Follow up With Specialties Details Why Contact Info    MONTANA Bolaños Nurse Practitioner, Family Medicine In 1 week As needed [facility]  [phone]            Patient's Medications   Discharge Prescriptions    AMOXICILLIN (AMOXIL) 500 MG CAPSULE    Take 1 capsule (500 mg total) by mouth 3 (three) times a day for 10 days       Start Date: 7/14/2022 End Date: 7/24/2022       Order Dose: 500 mg       Quantity: 30 capsule    Refills: 0       No discharge procedures on file      PDMP Review     None          ED Provider  Electronically Signed by           Jerzy Maldonado MD  07/14/22 4636

## 2022-07-15 NOTE — TELEPHONE ENCOUNTER
From: Ethan Orosco  To: MONTANA Dillon  Sent: 7/14/2022 9:26 PM EDT  Subject: Question regarding NOVEL CORONAVIRUS (COVID-19), PCR [facility]    Hello i was just wondering if the positive covid test could have resulted from my strep throat

## 2022-10-11 PROBLEM — H61.22 IMPACTED CERUMEN OF LEFT EAR: Status: RESOLVED | Noted: 2022-07-07 | Resolved: 2022-10-11

## 2022-10-11 PROBLEM — Z02.4 DRIVER'S PERMIT PHYSICAL EXAMINATION: Status: RESOLVED | Noted: 2022-07-07 | Resolved: 2022-10-11

## 2023-06-28 ENCOUNTER — TELEPHONE (OUTPATIENT)
Dept: FAMILY MEDICINE CLINIC | Facility: CLINIC | Age: 23
End: 2023-06-28

## 2023-06-28 NOTE — TELEPHONE ENCOUNTER
first attempt to contact patient   no answer    MESSAGE PHONE NUMBER NOT AVAILABLE  SENT MESSAGE THRU MY CHART THAT APPT IS GOING TO BE CANCELED TO CALL THE OFFICE TO RESCHEDULE

## 2024-04-18 ENCOUNTER — OFFICE VISIT (OUTPATIENT)
Dept: FAMILY MEDICINE CLINIC | Facility: CLINIC | Age: 24
End: 2024-04-18

## 2024-04-18 VITALS
TEMPERATURE: 98 F | WEIGHT: 217 LBS | SYSTOLIC BLOOD PRESSURE: 120 MMHG | BODY MASS INDEX: 32.89 KG/M2 | HEART RATE: 77 BPM | DIASTOLIC BLOOD PRESSURE: 82 MMHG | RESPIRATION RATE: 16 BRPM | OXYGEN SATURATION: 95 % | HEIGHT: 68 IN

## 2024-04-18 DIAGNOSIS — E66.9 OBESITY (BMI 30.0-34.9): ICD-10-CM

## 2024-04-18 DIAGNOSIS — Z11.59 NEED FOR HEPATITIS C SCREENING TEST: ICD-10-CM

## 2024-04-18 DIAGNOSIS — Z00.00 ANNUAL PHYSICAL EXAM: Primary | ICD-10-CM

## 2024-04-18 DIAGNOSIS — Z11.3 ROUTINE SCREENING FOR STI (SEXUALLY TRANSMITTED INFECTION): ICD-10-CM

## 2024-04-18 DIAGNOSIS — Z02.4 DRIVER'S PERMIT PHYSICAL EXAMINATION: ICD-10-CM

## 2024-04-18 DIAGNOSIS — Z11.4 SCREENING FOR HIV (HUMAN IMMUNODEFICIENCY VIRUS): ICD-10-CM

## 2024-04-18 PROCEDURE — 99395 PREV VISIT EST AGE 18-39: CPT | Performed by: NURSE PRACTITIONER

## 2024-04-18 NOTE — ASSESSMENT & PLAN NOTE
Wt Readings from Last 3 Encounters:   04/18/24 98.4 kg (217 lb)   07/14/22 89.9 kg (198 lb 3.1 oz)   07/07/22 94 kg (207 lb 4.8 oz)     -Encouraged diet and lifestyle changes: decrease processed foods (cakes, cookies, chips, soda), decrease total carbohydrate intake, decrease fried/fatty foods, increase fruits and vegetables, increase lean proteins (chicken, turkey), increase healthy fats (avocado, fish, nuts), drink plenty of water (at least four 16 oz bottles per day)

## 2024-04-18 NOTE — PROGRESS NOTES
ADULT ANNUAL PHYSICAL  [facility]    NAME: Dale Mujica  AGE: 23 y.o. SEX: male  : 2000     DATE: 2024     Assessment and Plan:     Problem List Items Addressed This Visit          Other    's permit physical examination     Pt with no hx of neurological disorders, seizures, syncope or other loss of consciousness, DM, HTN, CV disorders, cognitive impairments, alcohol abuse, or drug abuse. In-office vision screening passed and no exam findings today that would preclude safe operation of a vehicle.   Pt counseled regarding safe driving habits.  Learner's Permit form signed.             Obesity (BMI 30.0-34.9)     Wt Readings from Last 3 Encounters:   24 98.4 kg (217 lb)   22 89.9 kg (198 lb 3.1 oz)   22 94 kg (207 lb 4.8 oz)     -Encouraged diet and lifestyle changes: decrease processed foods (cakes, cookies, chips, soda), decrease total carbohydrate intake, decrease fried/fatty foods, increase fruits and vegetables, increase lean proteins (chicken, turkey), increase healthy fats (avocado, fish, nuts), drink plenty of water (at least four 16 oz bottles per day)           Relevant Orders    Lipid panel     Other Visit Diagnoses       Annual physical exam    -  Primary    Routine screening for STI (sexually transmitted infection)        Relevant Orders    Hepatitis C antibody    HIV 1/2 AG/AB w Reflex [facility] for 2 yr old and above    RPR-Syphilis Screening (Total Syphilis IGG/IGM)    Chlamydia/GC amplified DNA by PCR    Need for hepatitis C screening test        Relevant Orders    Hepatitis C antibody    Screening for HIV (human immunodeficiency virus)        Relevant Orders    HIV 1/2 AG/AB w Reflex [facility] for 2 yr old and above            Immunizations and preventive care screenings were discussed with patient today. Appropriate education was printed on patient's after visit  summary.    Counseling:  Alcohol/drug use: discussed moderation in alcohol intake, the recommendations for healthy alcohol use, and avoidance of illicit drug use.  Dental Health: discussed importance of regular tooth brushing, flossing, and dental visits.  Injury prevention: discussed safety/seat belts, safety helmets, smoke detectors, carbon dioxide detectors, and smoking near bedding or upholstery.  Sexual health: discussed sexually transmitted diseases, partner selection, use of condoms, avoidance of unintended pregnancy, and contraceptive alternatives.  Exercise: the importance of regular exercise/physical activity was discussed. Recommend exercise 3-5 times per week for at least 30 minutes.     BMI Counseling: Body mass index is 32.99 kg/m². The BMI is above normal. Nutrition recommendations include decreasing portion sizes, encouraging healthy choices of fruits and vegetables, decreasing fast food intake, consuming healthier snacks, limiting drinks that contain sugar, moderation in carbohydrate intake, increasing intake of lean protein, reducing intake of saturated and trans fat and reducing intake of cholesterol. Exercise recommendations include moderate physical activity 150 minutes/week. No pharmacotherapy was ordered. Patient referred to PCP. Rationale for BMI follow-up plan is due to patient being overweight or obese.     Depression Screening and Follow-up Plan: Patient was screened for depression during today's encounter. They screened negative with a PHQ-2 score of 0.           Chief Complaint:     Chief Complaint   Patient presents with    Physical Exam     DMV      History of Present Illness:     Adult Annual Physical   Patientis a 24 YO male with no significant PMH here for a comprehensive physical exam and to have 's physical form completed. The patient reports no problems.    The following portions of the patient's history were reviewed and updated as appropriate: allergies, current  medications, past family history, past medical history, past social history, past surgical history and problem list.      Diet and Physical Activity  Diet/Nutrition: poor diet, frequent junk food, and consuming 3-5 servings of fruits/vegetables daily.   Exercise: no formal exercise.      Depression Screening  PHQ-2/9 Depression Screening    Little interest or pleasure in doing things: 0 - not at all  Feeling down, depressed, or hopeless: 0 - not at all  PHQ-2 Score: 0  PHQ-2 Interpretation: Negative depression screen       General Health  Sleep: gets 7-8 hours of sleep on average.   Hearing:  no concerns .  Vision: goes for regular eye exams, most recent eye exam <1 year ago, and wears glasses.   Dental: no dental visits for >1 year and brushes teeth twice daily.        Health  History of STDs?: no.    Advanced Care Planning  Do you have an advanced directive? no  Do you have a durable medical power of ? no  ACP document given to the patient? no     Review of Systems:     Review of Systems   Constitutional:  Negative for chills and fever.   HENT:  Negative for ear pain and sore throat.    Eyes:  Negative for pain and visual disturbance.   Respiratory:  Negative for cough and shortness of breath.    Cardiovascular:  Negative for chest pain and palpitations.   Gastrointestinal:  Negative for abdominal pain and vomiting.   Genitourinary:  Negative for dysuria and hematuria.   Musculoskeletal:  Negative for arthralgias and back pain.   Skin:  Negative for color change and rash.   Neurological:  Negative for seizures and syncope.   All other systems reviewed and are negative.     Past Medical History:     Past Medical History:   Diagnosis Date    Acute right eye pain 3/2/2020    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      Past Surgical History:     Past Surgical History:   Procedure Laterality Date    APPENDECTOMY      Around age 16 or 17.    NO PAST SURGERIES        Social History:     Social History      Socioeconomic History    Marital status: Single     Spouse name: None    Number of children: None    Years of education: None    Highest education level: None   Occupational History    None   Tobacco Use    Smoking status: Never    Smokeless tobacco: Never   Vaping Use    Vaping status: Never Used   Substance and Sexual Activity    Alcohol use: No    Drug use: No    Sexual activity: None   Other Topics Concern    None   Social History Narrative    None     Social Determinants of Health     Financial Resource Strain: Low Risk  (4/18/2024)    Overall Financial Resource Strain (CARDIA)     Difficulty of Paying Living Expenses: Not hard at all   Food Insecurity: No Food Insecurity (4/18/2024)    Hunger Vital Sign     Worried About Running Out of Food in the Last Year: Never true     Ran Out of Food in the Last Year: Never true   Transportation Needs: No Transportation Needs (4/18/2024)    PRAPARE - Transportation     Lack of Transportation (Medical): No     Lack of Transportation (Non-Medical): No   Physical Activity: Not on file   Stress: Not on file   Social Connections: Not on file   Intimate Partner Violence: Not on file   Housing Stability: Low Risk  (4/18/2024)    Housing Stability Vital Sign     Unable to Pay for Housing in the Last Year: No     Number of Places Lived in the Last Year: 1     Unstable Housing in the Last Year: No      Family History:     Family History   Problem Relation Age of Onset    No Known Problems Mother     No Known Problems Father     Diabetes Maternal Grandmother       Current Medications:     Current Outpatient Medications   Medication Sig Dispense Refill    Multiple Vitamin (multivitamin) tablet Take 1 tablet by mouth daily       No current facility-administered medications for this visit.      Allergies:     No Known Allergies   Physical Exam:     /82 (BP Location: Right arm, Patient Position: Sitting, Cuff Size: Standard)   Pulse 77   Temp 98 °F (36.7 °C) (Temporal)    "Resp 16   Ht 5' 8\" (1.727 m)   Wt 98.4 kg (217 lb)   SpO2 95%   BMI 32.99 kg/m²     Physical Exam  Vitals and nursing note reviewed.   Constitutional:       General: He is not in acute distress.     Appearance: He is well-developed.   HENT:      Head: Normocephalic and atraumatic.   Eyes:      Conjunctiva/sclera: Conjunctivae normal.   Cardiovascular:      Rate and Rhythm: Normal rate and regular rhythm.      Heart sounds: No murmur heard.  Pulmonary:      Effort: Pulmonary effort is normal. No respiratory distress.      Breath sounds: Normal breath sounds.   Abdominal:      Palpations: Abdomen is soft.      Tenderness: There is no abdominal tenderness.   Musculoskeletal:         General: No swelling.      Cervical back: Neck supple.   Skin:     General: Skin is warm and dry.      Capillary Refill: Capillary refill takes less than 2 seconds.   Neurological:      Mental Status: He is alert.   Psychiatric:         Mood and Affect: Mood normal.        Vision Screening    Right eye Left eye Both eyes   Without correction      With correction 20/20 20/20 20/20         MONTANA Awan   [facility]    "

## 2024-04-18 NOTE — ASSESSMENT & PLAN NOTE
Pt with no hx of neurological disorders, seizures, syncope or other loss of consciousness, DM, HTN, CV disorders, cognitive impairments, alcohol abuse, or drug abuse. In-office vision screening passed and no exam findings today that would preclude safe operation of a vehicle.   Pt counseled regarding safe driving habits.  Learner's Permit form signed.